# Patient Record
Sex: FEMALE | Race: WHITE | Employment: OTHER | URBAN - METROPOLITAN AREA
[De-identification: names, ages, dates, MRNs, and addresses within clinical notes are randomized per-mention and may not be internally consistent; named-entity substitution may affect disease eponyms.]

---

## 2019-02-21 ENCOUNTER — MEDICAL CORRESPONDENCE (OUTPATIENT)
Dept: HEALTH INFORMATION MANAGEMENT | Facility: CLINIC | Age: 79
End: 2019-02-21

## 2019-03-07 ENCOUNTER — THERAPY VISIT (OUTPATIENT)
Dept: PHYSICAL THERAPY | Facility: CLINIC | Age: 79
End: 2019-03-07
Payer: COMMERCIAL

## 2019-03-07 DIAGNOSIS — M25.511 ACUTE PAIN OF BOTH SHOULDERS: ICD-10-CM

## 2019-03-07 DIAGNOSIS — M25.512 ACUTE PAIN OF BOTH SHOULDERS: ICD-10-CM

## 2019-03-07 DIAGNOSIS — M54.2 NECK PAIN: Primary | ICD-10-CM

## 2019-03-07 PROCEDURE — G8984 CARRY CURRENT STATUS: HCPCS | Mod: GP | Performed by: PHYSICAL THERAPIST

## 2019-03-07 PROCEDURE — G8984 CARRY CURRENT STATUS: HCPCS | Mod: GP

## 2019-03-07 PROCEDURE — 97162 PT EVAL MOD COMPLEX 30 MIN: CPT | Mod: GP | Performed by: PHYSICAL THERAPIST

## 2019-03-07 PROCEDURE — 97110 THERAPEUTIC EXERCISES: CPT | Mod: GP | Performed by: PHYSICAL THERAPIST

## 2019-03-07 PROCEDURE — 97530 THERAPEUTIC ACTIVITIES: CPT | Mod: GP | Performed by: PHYSICAL THERAPIST

## 2019-03-07 PROCEDURE — G8985 CARRY GOAL STATUS: HCPCS | Mod: GP

## 2019-03-07 PROCEDURE — G8985 CARRY GOAL STATUS: HCPCS | Mod: GP | Performed by: PHYSICAL THERAPIST

## 2019-03-07 NOTE — LETTER
[facility]YSICAL THERAP[facility]  [address]  [facility] [address]  [phone]    2019    Re: Cynthia Mcadams   :   1940  MRN:  [id number]   REFERRING PHYSICIAN:   Keshav Tellez    [facility]    Date of Initial Evaluation:   3/7/2019  Visits:   1  Reason for Referral:     Neck pain  Acute pain of both shoulders    [facility] Initial Evaluation -- Cervical  Evaluation Date: 2019  Cynthia Mcadams is a 78 year old female with a cervical & bilateral shoulder condition.   Referral: GP  Work mechanical stresses: prolonged sitting  Employment status: retired   Leisure mechanical stresses: normally walking 2-3 minutes 3x/day, daily or every other day   Functional disability score (NDI):  See flowsheet   VAS score (0-10): 6/10 today, 3-4 (during the day); worst 8-9 (at night)   Patient goals/expectations:  Increased ease of cooking, gardening, ADLs, and IADLs    HISTORY:  Present symptoms:  R shoulder, post arm, and and occasionally R shoulder blade. L shoulder and shoulder blade. Post neck bilaterally. HA behind R eye.   Pain quality (sharp/shooting/stabbing/aching/burning/cramping):   Dull, deep, aching, annoying  Paresthesia (yes/no):   N/T, coldness in R hand  Present since (onset date):  2019; referral 19     Symptoms (improving/unchanging/worsening):   unchanging  Symptoms commenced as a result of:  Pt fell while cooking. She rolled her ankle while turning and fell landing on her R shoulder.   Condition occurred in the following environment:  home  Symptoms at onset (neck/arm/forearm/headache):  neck  Constant symptoms (neck/arm/forearm/headache):   Intermittent symptoms (neck/arm/forearm/headache): neck, B shoulder, B shoulder blade, R post arm  Symptoms are made worse with the following:  Always sustained position, sometimes laying down,  "always sweeping, always reaching, sometimes lifting, always early evening & before bed, sometimes bending neck (increase headache), always heat.   Symptoms are made better with the following:  Sometimes on the move, essential oils, biofreeze, cold  Re: Cynthia Mcadams   :   1940    HISTORY (continued):  Disturbed sleep (yes/no): no    Number of pillows:  1  Sleeping postures (prone/sup/side R/L):  R side  Previous episodes (0/1-5/6-10/11+):     Year of first episode:   Previous history:  LBP following falls.   Pt has a fear of falling due to a history of falls. Her most recent was a fall in the yard last summer but she reports ~2 falls/year due to poor balance and her ears feeling \"plugged up\". She also reports a history of tinnitus. History of HA - increased since incident.   Previous treatments: none for LBP    Specific Questions: (as reported by the patient)  Dizziness/Tinnitus/Nausea/Swallowing (pos/neg): negative.  ringing in the ears - ongoing   Gait/Upper Limbs (normal/abnormal): normal; feels a need to find support with surrounding surfaces   Medications (nil/NSAIDS/anlag/steroids/anticoag/other):  Other - Cardiac and High blood pressure  Medical allergies:  none  General health (excellent/good/fair/poor):  fair  Pertinent medical history:  Cancer, Diabetes, Heart problems, High blood pressure, Implanted device, Kidney disease, Migraines/Headaches, Osteoarthritis, Overweight and Sleep disorder/apnea  Imaging (None/Xray/MRI/Other):   no  Recent or major surgery (yes/no):  mastectomy   (R)  (L)  Night pain (yes/no):  no  Accidents (yes/no):  no  Unexplained weight loss (yes/no):  no  Barriers at home: no  Other red flags: no    EXAMINATION    Posture:   Sitting (good/fair/poor):  poor   Standing (good/fair/poor): poor   Protruded head (yes/no): yes    Wry Neck (right/left/nil):  nil    Relevant (yes/no):     Correction of posture(better/worse/no effect): better  Other observations:  " Increased kyphosis     Neurological:  Motor Deficit:  R sh ROM: Flex L 90, R 148, abd L 81, R 110, F/ER L to ear, R to T1, E/IR L to lateral hip, R to bra line.      Reflexes:  NA  Sensory Deficit: NA      Dural signs:  NA      Re: Cynthia Mcadams   :   1940    Movement Loss:   Ko Mod Min Nil Pain   Protrusion    x    Flexion   x     Retraction   x     Extension  x   pn post neck   Lateral flexion R        Lateral flexion L        Rotation R  x   pn post neck   Rotation L  x   pn post neck   Test Movements:   During: produces, abolishes, increases, decreases, no effect, centralizing, peripheralizing  After: better, worse, no better, no worse, no effect, centralized, peripheralized  Pretest symptoms sittin/10 pn L neck & shoulder, see Sh ROM above   Symptoms During Symptoms After ROM increased ROM decreased No Effect   PRO        Rep PRO        RET Increases    No Worse         Rep RET Increases    No Worse    X  R sh abd X  L sh flex & abd    RET EXT Increases - base of neck          Rep RET EXT Decreases - sh; Increases - base of neck     Better    X   B sh abd, inc CS extension & B rotation      Pretest symptoms lying:     Symptoms During Symptoms After ROM increased ROM decreased No Effect   RET        Rep RET        RET EXT        Rep RET EXT        If required, pretest symptoms sitting:      Symptoms During Symptoms After ROM increased ROM decreased No Effect   LF-R        Rep LF-R        LF-L        Rep LF-L        ROT-R        Rep ROT-R        ROT-L        Rep ROT-L        FLEX        Rep FLEX        Static Tests:   Protrusion:    Flexion:    Retraction:    Extension (sitting/prone/supine):    Other Tests:   Provisional Classification:  Derangement - Bilateral, symmetrical, symptoms above elbow  Principle of Management:  Education:  Use of lumbar support for correction of spinal alignment with seated posture.Specificity of directional movements with exercise to move spinal segments and  relieve pain. Centralization vs peripheralization of pain.       Equipment provided:  none  Mechanical therapy (Y/N):  yes   Extension principle:  Repeated extension in sitting 10-15 reps every 2 hours  Lateral principle:    Flexion principle:       Other:      ASSESSMENT/PLAN:  Patient is a 78 year old female with cervical and both sides shoulder complaints.    Patient has the following significant findings with corresponding treatment plan.                Diagnosis 1:  Neck & B shoulder pain   Pain -  hot/cold therapy, manual therapy, self management, education, directional preference exercise and home program  Decreased ROM/flexibility - manual therapy, therapeutic exercise and home program  Decreased strength - therapeutic exercise, therapeutic activities and home program  Impaired muscle performance - neuro re-education and home program  Decreased function - therapeutic activities and home program  Impaired posture - neuro re-education and home program    Therapy Evaluation Codes:   1) History comprised of:   Personal factors that impact the plan of care:      None.    Comorbidity factors that impact the plan of care are:      Cancer, Diabetes, Heart problems, High blood pressure, Implanted device,   Migraines/headaches, Osteoarthritis, Overweight, Pain at night/rest and Sleep   disorder/apnea.     Medications impacting care: Cardiac and High blood pressure.  2) Examination of Body Systems comprised of:   Body structures and functions that impact the plan of care:      Cervical spine and Shoulder.   Activity limitations that impact the plan of care are:      Bending, Lifting and Laying down.  3) Clinical presentation characteristics are:   Evolving/Changing.  4) Decision-Making    Moderate complexity using standardized patient assessment instrument and/or   measureable assessment of functional outcome.  Cumulative Therapy Evaluation is: Moderate complexity.  Re: Cynthia Mcadams   :    1940    Previous and current functional limitations:  (See Goal Flow Sheet for this information)    Short term and Long term goals: (See Goal Flow Sheet for this information)   Communication ability:  Patient appears to be able to clearly communicate and understand verbal and written communication and follow directions correctly.  Treatment Explanation - The following has been discussed with the patient:   RX ordered/plan of care, Anticipated outcomes, Possible risks and side effects    This patient would benefit from PT intervention to resume normal activities.   Rehab potential is good.  Frequency:  2 X a month, once daily  Duration:  for 3 months  Discharge Plan:  Achieve all LTG.  Independent in home treatment program.  Reach maximal therapeutic benefit.    Thank you for your referral.    INQUIRIES        Therapist:  Luis M Shahid DPT, Cert. MDT  [facility]  [address] [address]  Phone: [phone]  Fax: [phone]

## 2019-03-07 NOTE — PROGRESS NOTES
[facility] Initial Evaluation -- Cervical    Evaluation Date: March 7, 2019  Cynthia Mcadams is a 78 year old female with a cervical & bilateral shoulder condition.   Referral: GP  Work mechanical stresses: prolonged sitting  Employment status: retired   Leisure mechanical stresses: normally walking 2-3 minutes 3x/day, daily or every other day   Functional disability score (NDI):  See flowsheet   VAS score (0-10): 6/10 today, 3-4 (during the day); worst 8-9 (at night)   Patient goals/expectations:  Increased ease of cooking, gardening, ADLs, and IADLs    HISTORY:    Present symptoms:  R shoulder, post arm, and and occasionally R shoulder blade. L shoulder and shoulder blade. Post neck bilaterally. HA behind R eye.   Pain quality (sharp/shooting/stabbing/aching/burning/cramping):   Dull, deep, aching, annoying  Paresthesia (yes/no):   N/T, coldness in R hand    Present since (onset date):  Feb 20, 2019; referral 2/21/19     Symptoms (improving/unchanging/worsening):   unchanging    Symptoms commenced as a result of:  Pt fell while cooking. She rolled her ankle while turning and fell landing on her R shoulder.   Condition occurred in the following environment:  home    Symptoms at onset (neck/arm/forearm/headache):  neck  Constant symptoms (neck/arm/forearm/headache):   Intermittent symptoms (neck/arm/forearm/headache): neck, B shoulder, B shoulder blade, R post arm    Symptoms are made worse with the following:  Always sustained position, sometimes laying down, always sweeping, always reaching, sometimes lifting, always early evening & before bed, sometimes bending neck (increase headache), always heat.   Symptoms are made better with the following:  Sometimes on the move, essential oils, biofreeze, cold    Disturbed sleep (yes/no): no  Number of pillows:  1  Sleeping postures (prone/sup/side R/L):  R side    Previous episodes (0/1-5/6-10/11+):   Year of first episode:     Previous history:  " LBP following falls.     Pt has a fear of falling due to a history of falls. Her most recent was a fall in the yard last summer but she reports ~2 falls/year due to poor balance and her ears feeling \"plugged up\". She also reports a history of tinnitus. History of HA - increased since incident.     Previous treatments: none for LBP    Specific Questions: (as reported by the patient)  Dizziness/Tinnitus/Nausea/Swallowing (pos/neg): negative.  ringing in the ears - ongoing   Gait/Upper Limbs (normal/abnormal): normal; feels a need to find support with surrounding surfaces   Medications (nil/NSAIDS/anlag/steroids/anticoag/other):  Other - Cardiac and High blood pressure  Medical allergies:  none  General health (excellent/good/fair/poor):  fair  Pertinent medical history:  Cancer, Diabetes, Heart problems, High blood pressure, Implanted device, Kidney disease, Migraines/Headaches, Osteoarthritis, Overweight and Sleep disorder/apnea  Imaging (None/Xray/MRI/Other):   no  Recent or major surgery (yes/no):  mastectomy  2015 (R) 1999 (L)  Night pain (yes/no):  no  Accidents (yes/no):  no  Unexplained weight loss (yes/no):  no  Barriers at home: no  Other red flags: no    EXAMINATION    Posture:   Sitting (good/fair/poor):  poor  Standing (good/fair/poor): poor     Protruded head (yes/no): yes    Wry Neck (right/left/nil):  nil  Relevant (yes/no):       Correction of posture(better/worse/no effect): better  Other observations:  Increased kyphosis     Neurological:    Motor Deficit:  R sh ROM: Flex L 90, R 148, abd L 81, R 110, F/ER L to ear, R to T1, E/IR L to lateral hip, R to bra line.    Reflexes:  NA  Sensory Deficit: NA    Dural signs:  NA    Movement Loss:   Ko Mod Min Nil Pain   Protrusion    x    Flexion   x     Retraction   x     Extension  x   pn post neck   Lateral flexion R        Lateral flexion L        Rotation R  x   pn post neck   Rotation L  x   pn post neck     Test Movements:   During: produces, " abolishes, increases, decreases, no effect, centralizing, peripheralizing  After: better, worse, no better, no worse, no effect, centralized, peripheralized    Pretest symptoms sittin/10 pn L neck & shoulder, see Sh ROM above   Symptoms During Symptoms After ROM increased ROM decreased No Effect   PRO        Rep PRO        RET Increases    No Worse         Rep RET Increases    No Worse    X  R sh abd X  L sh flex & abd    RET EXT Increases - base of neck          Rep RET EXT Decreases - sh; Increases - base of neck     Better    X   B sh abd, inc CS extension & B rotation      Pretest symptoms lying:     Symptoms During Symptoms After ROM increased ROM decreased No Effect   RET        Rep RET        RET EXT        Rep RET EXT        If required, pretest symptoms sitting:      Symptoms During Symptoms After ROM increased ROM decreased No Effect   LF-R        Rep LF-R        LF-L        Rep LF-L        ROT-R        Rep ROT-R        ROT-L        Rep ROT-L        FLEX        Rep FLEX            Static Tests:   Protrusion:    Flexion:    Retraction:    Extension (sitting/prone/supine):      Other Tests:     Provisional Classification:  Derangement - Bilateral, symmetrical, symptoms above elbow    Principle of Management:  Education:  Use of lumbar support for correction of spinal alignment with seated posture.Specificity of directional movements with exercise to move spinal segments and relieve pain. Centralization vs peripheralization of pain.      Equipment provided:  none  Mechanical therapy (Y/N):  yes   Extension principle:  Repeated extension in sitting 10-15 reps every 2 hours   Lateral principle:    Flexion principle:     Other:      ASSESSMENT/PLAN:    Patient is a 78 year old female with cervical and both sides shoulder complaints.    Patient has the following significant findings with corresponding treatment plan.                Diagnosis 1:  Neck & B shoulder pain   Pain -  hot/cold therapy, manual  therapy, self management, education, directional preference exercise and home program  Decreased ROM/flexibility - manual therapy, therapeutic exercise and home program  Decreased strength - therapeutic exercise, therapeutic activities and home program  Impaired muscle performance - neuro re-education and home program  Decreased function - therapeutic activities and home program  Impaired posture - neuro re-education and home program    Therapy Evaluation Codes:   1) History comprised of:   Personal factors that impact the plan of care:      None.    Comorbidity factors that impact the plan of care are:      Cancer, Diabetes, Heart problems, High blood pressure, Implanted device, Migraines/headaches, Osteoarthritis, Overweight, Pain at night/rest and Sleep disorder/apnea.     Medications impacting care: Cardiac and High blood pressure.  2) Examination of Body Systems comprised of:   Body structures and functions that impact the plan of care:      Cervical spine and Shoulder.   Activity limitations that impact the plan of care are:      Bending, Lifting and Laying down.  3) Clinical presentation characteristics are:   Evolving/Changing.  4) Decision-Making    Moderate complexity using standardized patient assessment instrument and/or measureable assessment of functional outcome.  Cumulative Therapy Evaluation is: Moderate complexity.    Previous and current functional limitations:  (See Goal Flow Sheet for this information)    Short term and Long term goals: (See Goal Flow Sheet for this information)     Communication ability:  Patient appears to be able to clearly communicate and understand verbal and written communication and follow directions correctly.  Treatment Explanation - The following has been discussed with the patient:   RX ordered/plan of care  Anticipated outcomes  Possible risks and side effects  This patient would benefit from PT intervention to resume normal activities.   Rehab potential is  good.    Frequency:  2 X a month, once daily  Duration:  for 3 months  Discharge Plan:  Achieve all LTG.  Independent in home treatment program.  Reach maximal therapeutic benefit.    Please refer to the daily flowsheet for treatment today, total treatment time and time spent performing 1:1 timed codes.

## 2019-10-28 PROBLEM — M25.511 ACUTE PAIN OF BOTH SHOULDERS: Status: RESOLVED | Noted: 2019-03-07 | Resolved: 2019-10-28

## 2019-10-28 PROBLEM — M54.2 NECK PAIN: Status: RESOLVED | Noted: 2019-03-07 | Resolved: 2019-10-28

## 2019-10-28 PROBLEM — M25.512 ACUTE PAIN OF BOTH SHOULDERS: Status: RESOLVED | Noted: 2019-03-07 | Resolved: 2019-10-28

## 2023-01-01 ENCOUNTER — LAB REQUISITION (OUTPATIENT)
Dept: LAB | Facility: CLINIC | Age: 83
End: 2023-01-01
Payer: COMMERCIAL

## 2023-01-01 ENCOUNTER — LAB REQUISITION (OUTPATIENT)
Dept: LAB | Facility: CLINIC | Age: 83
End: 2023-01-01

## 2023-01-01 DIAGNOSIS — R10.2 PELVIC AND PERINEAL PAIN: ICD-10-CM

## 2023-01-01 DIAGNOSIS — N30.00 ACUTE CYSTITIS WITHOUT HEMATURIA: ICD-10-CM

## 2023-01-01 DIAGNOSIS — E11.65 TYPE 2 DIABETES MELLITUS WITH HYPERGLYCEMIA (H): ICD-10-CM

## 2023-01-01 DIAGNOSIS — I50.22 CHRONIC SYSTOLIC (CONGESTIVE) HEART FAILURE (H): ICD-10-CM

## 2023-01-01 DIAGNOSIS — I13.0 HYPERTENSIVE HEART AND CHRONIC KIDNEY DISEASE WITH HEART FAILURE AND STAGE 1 THROUGH STAGE 4 CHRONIC KIDNEY DISEASE, OR UNSPECIFIED CHRONIC KIDNEY DISEASE (H): ICD-10-CM

## 2023-01-01 DIAGNOSIS — N18.9 CHRONIC KIDNEY DISEASE, UNSPECIFIED: ICD-10-CM

## 2023-01-01 DIAGNOSIS — D64.89 OTHER SPECIFIED ANEMIAS: ICD-10-CM

## 2023-01-01 DIAGNOSIS — R54 AGE-RELATED PHYSICAL DEBILITY: ICD-10-CM

## 2023-01-01 LAB
ALBUMIN SERPL BCG-MCNC: 3.7 G/DL (ref 3.5–5.2)
ALBUMIN SERPL BCG-MCNC: 3.7 G/DL (ref 3.5–5.2)
ALBUMIN SERPL BCG-MCNC: 3.8 G/DL (ref 3.5–5.2)
ALBUMIN UR-MCNC: NEGATIVE MG/DL
ALP SERPL-CCNC: 86 U/L (ref 40–150)
ALP SERPL-CCNC: 88 U/L (ref 40–150)
ALP SERPL-CCNC: 99 U/L (ref 35–104)
ALT SERPL W P-5'-P-CCNC: 16 U/L (ref 0–50)
ALT SERPL W P-5'-P-CCNC: 23 U/L (ref 0–50)
ALT SERPL W P-5'-P-CCNC: 25 U/L (ref 0–50)
ANION GAP SERPL CALCULATED.3IONS-SCNC: 12 MMOL/L (ref 7–15)
ANION GAP SERPL CALCULATED.3IONS-SCNC: 12 MMOL/L (ref 7–15)
ANION GAP SERPL CALCULATED.3IONS-SCNC: 14 MMOL/L (ref 7–15)
ANION GAP SERPL CALCULATED.3IONS-SCNC: 14 MMOL/L (ref 7–15)
APPEARANCE UR: CLEAR
AST SERPL W P-5'-P-CCNC: 28 U/L (ref 0–45)
AST SERPL W P-5'-P-CCNC: 34 U/L (ref 0–45)
AST SERPL W P-5'-P-CCNC: 40 U/L (ref 0–45)
BACTERIA UR CULT: NORMAL
BASOPHILS # BLD AUTO: 0 10E3/UL (ref 0–0.2)
BASOPHILS # BLD AUTO: 0.1 10E3/UL (ref 0–0.2)
BASOPHILS # BLD AUTO: 0.1 10E3/UL (ref 0–0.2)
BASOPHILS NFR BLD AUTO: 1 %
BILIRUB SERPL-MCNC: 0.7 MG/DL
BILIRUB SERPL-MCNC: 0.9 MG/DL
BILIRUB SERPL-MCNC: 1.2 MG/DL
BILIRUB UR QL STRIP: NEGATIVE
BUN SERPL-MCNC: 50.7 MG/DL (ref 8–23)
BUN SERPL-MCNC: 59.8 MG/DL (ref 8–23)
BUN SERPL-MCNC: 61.9 MG/DL (ref 8–23)
BUN SERPL-MCNC: 63 MG/DL (ref 8–23)
CALCIUM SERPL-MCNC: 9.1 MG/DL (ref 8.8–10.2)
CALCIUM SERPL-MCNC: 9.2 MG/DL (ref 8.8–10.2)
CALCIUM SERPL-MCNC: 9.3 MG/DL (ref 8.8–10.2)
CALCIUM SERPL-MCNC: 9.5 MG/DL (ref 8.8–10.2)
CHLORIDE SERPL-SCNC: 101 MMOL/L (ref 98–107)
CHLORIDE SERPL-SCNC: 101 MMOL/L (ref 98–107)
CHLORIDE SERPL-SCNC: 102 MMOL/L (ref 98–107)
CHLORIDE SERPL-SCNC: 102 MMOL/L (ref 98–107)
COLOR UR AUTO: ABNORMAL
CREAT SERPL-MCNC: 2.05 MG/DL (ref 0.51–0.95)
CREAT SERPL-MCNC: 2.15 MG/DL (ref 0.51–0.95)
CREAT SERPL-MCNC: 2.26 MG/DL (ref 0.51–0.95)
CREAT SERPL-MCNC: 2.47 MG/DL (ref 0.51–0.95)
DEPRECATED HCO3 PLAS-SCNC: 27 MMOL/L (ref 22–29)
DEPRECATED HCO3 PLAS-SCNC: 29 MMOL/L (ref 22–29)
DEPRECATED HCO3 PLAS-SCNC: 30 MMOL/L (ref 22–29)
DEPRECATED HCO3 PLAS-SCNC: 30 MMOL/L (ref 22–29)
EGFRCR SERPLBLD CKD-EPI 2021: 19 ML/MIN/1.73M2
EGFRCR SERPLBLD CKD-EPI 2021: 21 ML/MIN/1.73M2
EGFRCR SERPLBLD CKD-EPI 2021: 22 ML/MIN/1.73M2
EGFRCR SERPLBLD CKD-EPI 2021: 24 ML/MIN/1.73M2
EOSINOPHIL # BLD AUTO: 0 10E3/UL (ref 0–0.7)
EOSINOPHIL # BLD AUTO: 0.2 10E3/UL (ref 0–0.7)
EOSINOPHIL # BLD AUTO: 0.2 10E3/UL (ref 0–0.7)
EOSINOPHIL NFR BLD AUTO: 1 %
EOSINOPHIL NFR BLD AUTO: 2 %
EOSINOPHIL NFR BLD AUTO: 3 %
ERYTHROCYTE [DISTWIDTH] IN BLOOD BY AUTOMATED COUNT: 15.4 % (ref 10–15)
ERYTHROCYTE [DISTWIDTH] IN BLOOD BY AUTOMATED COUNT: 16 % (ref 10–15)
ERYTHROCYTE [DISTWIDTH] IN BLOOD BY AUTOMATED COUNT: 16 % (ref 10–15)
ERYTHROCYTE [DISTWIDTH] IN BLOOD BY AUTOMATED COUNT: 16.6 % (ref 10–15)
FOLATE SERPL-MCNC: 28 NG/ML (ref 4.6–34.8)
GLUCOSE SERPL-MCNC: 102 MG/DL (ref 70–99)
GLUCOSE SERPL-MCNC: 108 MG/DL (ref 70–99)
GLUCOSE SERPL-MCNC: 159 MG/DL (ref 70–99)
GLUCOSE SERPL-MCNC: 86 MG/DL (ref 70–99)
GLUCOSE UR STRIP-MCNC: NEGATIVE MG/DL
HCT VFR BLD AUTO: 34.1 % (ref 35–47)
HCT VFR BLD AUTO: 35.5 % (ref 35–47)
HCT VFR BLD AUTO: 36.2 % (ref 35–47)
HCT VFR BLD AUTO: 37.4 % (ref 35–47)
HGB BLD-MCNC: 10.2 G/DL (ref 11.7–15.7)
HGB BLD-MCNC: 11 G/DL (ref 11.7–15.7)
HGB BLD-MCNC: 11.2 G/DL (ref 11.7–15.7)
HGB BLD-MCNC: 11.2 G/DL (ref 11.7–15.7)
HGB UR QL STRIP: NEGATIVE
IMM GRANULOCYTES # BLD: 0 10E3/UL
IMM GRANULOCYTES NFR BLD: 0 %
IMM GRANULOCYTES NFR BLD: 0 %
IMM GRANULOCYTES NFR BLD: 1 %
KETONES UR STRIP-MCNC: NEGATIVE MG/DL
LEUKOCYTE ESTERASE UR QL STRIP: NEGATIVE
LYMPHOCYTES # BLD AUTO: 1.1 10E3/UL (ref 0.8–5.3)
LYMPHOCYTES # BLD AUTO: 1.2 10E3/UL (ref 0.8–5.3)
LYMPHOCYTES # BLD AUTO: 1.3 10E3/UL (ref 0.8–5.3)
LYMPHOCYTES NFR BLD AUTO: 16 %
LYMPHOCYTES NFR BLD AUTO: 17 %
LYMPHOCYTES NFR BLD AUTO: 21 %
MAGNESIUM SERPL-MCNC: 2.6 MG/DL (ref 1.7–2.3)
MCH RBC QN AUTO: 32.6 PG (ref 26.5–33)
MCH RBC QN AUTO: 32.9 PG (ref 26.5–33)
MCH RBC QN AUTO: 32.9 PG (ref 26.5–33)
MCH RBC QN AUTO: 33.7 PG (ref 26.5–33)
MCHC RBC AUTO-ENTMCNC: 29.9 G/DL (ref 31.5–36.5)
MCHC RBC AUTO-ENTMCNC: 29.9 G/DL (ref 31.5–36.5)
MCHC RBC AUTO-ENTMCNC: 30.9 G/DL (ref 31.5–36.5)
MCHC RBC AUTO-ENTMCNC: 31 G/DL (ref 31.5–36.5)
MCV RBC AUTO: 105 FL (ref 78–100)
MCV RBC AUTO: 107 FL (ref 78–100)
MCV RBC AUTO: 110 FL (ref 78–100)
MCV RBC AUTO: 113 FL (ref 78–100)
MONOCYTES # BLD AUTO: 0.7 10E3/UL (ref 0–1.3)
MONOCYTES # BLD AUTO: 0.7 10E3/UL (ref 0–1.3)
MONOCYTES # BLD AUTO: 0.8 10E3/UL (ref 0–1.3)
MONOCYTES NFR BLD AUTO: 11 %
MONOCYTES NFR BLD AUTO: 11 %
MONOCYTES NFR BLD AUTO: 9 %
MUCOUS THREADS #/AREA URNS LPF: PRESENT /LPF
NEUTROPHILS # BLD AUTO: 4 10E3/UL (ref 1.6–8.3)
NEUTROPHILS # BLD AUTO: 4.8 10E3/UL (ref 1.6–8.3)
NEUTROPHILS # BLD AUTO: 5.5 10E3/UL (ref 1.6–8.3)
NEUTROPHILS NFR BLD AUTO: 64 %
NEUTROPHILS NFR BLD AUTO: 70 %
NEUTROPHILS NFR BLD AUTO: 71 %
NITRATE UR QL: NEGATIVE
NRBC # BLD AUTO: 0 10E3/UL
NRBC BLD AUTO-RTO: 0 /100
NT-PROBNP SERPL-MCNC: ABNORMAL PG/ML (ref 0–1800)
PH UR STRIP: 6 [PH] (ref 5–7)
PLATELET # BLD AUTO: 189 10E3/UL (ref 150–450)
PLATELET # BLD AUTO: 198 10E3/UL (ref 150–450)
PLATELET # BLD AUTO: 221 10E3/UL (ref 150–450)
PLATELET # BLD AUTO: 242 10E3/UL (ref 150–450)
POTASSIUM SERPL-SCNC: 3.5 MMOL/L (ref 3.4–5.3)
POTASSIUM SERPL-SCNC: 3.9 MMOL/L (ref 3.4–5.3)
POTASSIUM SERPL-SCNC: 3.9 MMOL/L (ref 3.4–5.3)
POTASSIUM SERPL-SCNC: 4.2 MMOL/L (ref 3.4–5.3)
PROT SERPL-MCNC: 6.4 G/DL (ref 6.4–8.3)
PROT SERPL-MCNC: 6.7 G/DL (ref 6.4–8.3)
PROT SERPL-MCNC: 6.8 G/DL (ref 6.4–8.3)
RBC # BLD AUTO: 3.1 10E6/UL (ref 3.8–5.2)
RBC # BLD AUTO: 3.32 10E6/UL (ref 3.8–5.2)
RBC # BLD AUTO: 3.37 10E6/UL (ref 3.8–5.2)
RBC # BLD AUTO: 3.4 10E6/UL (ref 3.8–5.2)
RBC URINE: <1 /HPF
SODIUM SERPL-SCNC: 140 MMOL/L (ref 135–145)
SODIUM SERPL-SCNC: 143 MMOL/L (ref 135–145)
SODIUM SERPL-SCNC: 145 MMOL/L (ref 135–145)
SODIUM SERPL-SCNC: 146 MMOL/L (ref 135–145)
SP GR UR STRIP: 1.01 (ref 1–1.03)
SQUAMOUS EPITHELIAL: 1 /HPF
UROBILINOGEN UR STRIP-MCNC: NORMAL MG/DL
VIT B12 SERPL-MCNC: 601 PG/ML (ref 232–1245)
WBC # BLD AUTO: 6.2 10E3/UL (ref 4–11)
WBC # BLD AUTO: 6.7 10E3/UL (ref 4–11)
WBC # BLD AUTO: 7.2 10E3/UL (ref 4–11)
WBC # BLD AUTO: 7.7 10E3/UL (ref 4–11)
WBC URINE: 3 /HPF

## 2023-01-01 PROCEDURE — 83880 ASSAY OF NATRIURETIC PEPTIDE: CPT | Mod: ORL | Performed by: REGISTERED NURSE

## 2023-01-01 PROCEDURE — 36415 COLL VENOUS BLD VENIPUNCTURE: CPT | Mod: ORL | Performed by: REGISTERED NURSE

## 2023-01-01 PROCEDURE — P9604 ONE-WAY ALLOW PRORATED TRIP: HCPCS | Mod: ORL | Performed by: REGISTERED NURSE

## 2023-01-01 PROCEDURE — 80048 BASIC METABOLIC PNL TOTAL CA: CPT | Mod: ORL | Performed by: REGISTERED NURSE

## 2023-01-01 PROCEDURE — 82746 ASSAY OF FOLIC ACID SERUM: CPT | Mod: ORL | Performed by: REGISTERED NURSE

## 2023-01-01 PROCEDURE — 85025 COMPLETE CBC W/AUTO DIFF WBC: CPT | Mod: ORL | Performed by: REGISTERED NURSE

## 2023-01-01 PROCEDURE — 85027 COMPLETE CBC AUTOMATED: CPT | Mod: ORL | Performed by: REGISTERED NURSE

## 2023-01-01 PROCEDURE — 83735 ASSAY OF MAGNESIUM: CPT | Mod: ORL | Performed by: REGISTERED NURSE

## 2023-01-01 PROCEDURE — 80053 COMPREHEN METABOLIC PANEL: CPT | Mod: ORL | Performed by: REGISTERED NURSE

## 2023-01-01 PROCEDURE — 82607 VITAMIN B-12: CPT | Mod: ORL | Performed by: REGISTERED NURSE

## 2023-01-01 PROCEDURE — 81001 URINALYSIS AUTO W/SCOPE: CPT | Mod: ORL | Performed by: REGISTERED NURSE

## 2023-01-01 PROCEDURE — 87086 URINE CULTURE/COLONY COUNT: CPT | Mod: ORL | Performed by: REGISTERED NURSE
